# Patient Record
Sex: MALE | Race: WHITE
[De-identification: names, ages, dates, MRNs, and addresses within clinical notes are randomized per-mention and may not be internally consistent; named-entity substitution may affect disease eponyms.]

---

## 2020-09-19 ENCOUNTER — HOSPITAL ENCOUNTER (INPATIENT)
Dept: HOSPITAL 92 - ERS | Age: 60
LOS: 2 days | Discharge: HOME | DRG: 200 | End: 2020-09-21
Attending: SURGERY | Admitting: SURGERY
Payer: COMMERCIAL

## 2020-09-19 VITALS — BODY MASS INDEX: 23.6 KG/M2

## 2020-09-19 DIAGNOSIS — Z88.0: ICD-10-CM

## 2020-09-19 DIAGNOSIS — S42.002A: ICD-10-CM

## 2020-09-19 DIAGNOSIS — J44.9: ICD-10-CM

## 2020-09-19 DIAGNOSIS — S22.41XA: ICD-10-CM
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
## 2020-09-19 DIAGNOSIS — Z98.890: ICD-10-CM

## 2020-09-19 DIAGNOSIS — S27.2XXA: Primary | ICD-10-CM

## 2020-09-19 DIAGNOSIS — Z20.828: ICD-10-CM

## 2020-09-19 LAB
ALBUMIN SERPL BCG-MCNC: 4.2 G/DL (ref 3.4–4.8)
ALP SERPL-CCNC: 40 U/L (ref 40–110)
ALT SERPL W P-5'-P-CCNC: 16 U/L (ref 8–55)
ANION GAP SERPL CALC-SCNC: 14 MMOL/L (ref 10–20)
APTT PPP: 26.5 SEC (ref 22.9–36.1)
AST SERPL-CCNC: 22 U/L (ref 5–34)
BASOPHILS # BLD AUTO: 0.1 THOU/UL (ref 0–0.2)
BASOPHILS NFR BLD AUTO: 0.9 % (ref 0–1)
BILIRUB SERPL-MCNC: 0.4 MG/DL (ref 0.2–1.2)
BUN SERPL-MCNC: 17 MG/DL (ref 8.4–25.7)
CALCIUM SERPL-MCNC: 9 MG/DL (ref 7.8–10.44)
CHLORIDE SERPL-SCNC: 104 MMOL/L (ref 98–107)
CO2 SERPL-SCNC: 25 MMOL/L (ref 23–31)
CREAT CL PREDICTED SERPL C-G-VRATE: 0 ML/MIN (ref 70–130)
EOSINOPHIL # BLD AUTO: 0.1 THOU/UL (ref 0–0.7)
EOSINOPHIL NFR BLD AUTO: 1 % (ref 0–10)
GLOBULIN SER CALC-MCNC: 2.2 G/DL (ref 2.4–3.5)
GLUCOSE SERPL-MCNC: 134 MG/DL (ref 80–115)
HGB BLD-MCNC: 15.8 G/DL (ref 14–18)
INR PPP: 0.9
LIPASE SERPL-CCNC: 23 U/L (ref 8–78)
LYMPHOCYTES # BLD: 2.3 THOU/UL (ref 1.2–3.4)
LYMPHOCYTES NFR BLD AUTO: 22.9 % (ref 21–51)
MCH RBC QN AUTO: 31.7 PG (ref 27–31)
MCV RBC AUTO: 93.1 FL (ref 78–98)
MONOCYTES # BLD AUTO: 0.6 THOU/UL (ref 0.11–0.59)
MONOCYTES NFR BLD AUTO: 5.7 % (ref 0–10)
NEUTROPHILS # BLD AUTO: 6.9 THOU/UL (ref 1.4–6.5)
NEUTROPHILS NFR BLD AUTO: 69.5 % (ref 42–75)
PLATELET # BLD AUTO: 268 THOU/UL (ref 130–400)
POTASSIUM SERPL-SCNC: 3.6 MMOL/L (ref 3.5–5.1)
PROTHROMBIN TIME: 12.4 SEC (ref 12–14.7)
RBC # BLD AUTO: 4.97 MILL/UL (ref 4.7–6.1)
RBC UR QL AUTO: (no result) HPF (ref 0–3)
SODIUM SERPL-SCNC: 139 MMOL/L (ref 136–145)
SP GR UR STRIP: (no result) (ref 1–1.04)
WBC # BLD AUTO: 9.9 THOU/UL (ref 4.8–10.8)
WBC UR QL AUTO: (no result) HPF (ref 0–3)

## 2020-09-19 PROCEDURE — 93005 ELECTROCARDIOGRAM TRACING: CPT

## 2020-09-19 PROCEDURE — 85610 PROTHROMBIN TIME: CPT

## 2020-09-19 PROCEDURE — 72170 X-RAY EXAM OF PELVIS: CPT

## 2020-09-19 PROCEDURE — U0003 INFECTIOUS AGENT DETECTION BY NUCLEIC ACID (DNA OR RNA); SEVERE ACUTE RESPIRATORY SYNDROME CORONAVIRUS 2 (SARS-COV-2) (CORONAVIRUS DISEASE [COVID-19]), AMPLIFIED PROBE TECHNIQUE, MAKING USE OF HIGH THROUGHPUT TECHNOLOGIES AS DESCRIBED BY CMS-2020-01-R: HCPCS

## 2020-09-19 PROCEDURE — 96374 THER/PROPH/DIAG INJ IV PUSH: CPT

## 2020-09-19 PROCEDURE — 83605 ASSAY OF LACTIC ACID: CPT

## 2020-09-19 PROCEDURE — 96361 HYDRATE IV INFUSION ADD-ON: CPT

## 2020-09-19 PROCEDURE — 80307 DRUG TEST PRSMV CHEM ANLYZR: CPT

## 2020-09-19 PROCEDURE — 90471 IMMUNIZATION ADMIN: CPT

## 2020-09-19 PROCEDURE — 96375 TX/PRO/DX INJ NEW DRUG ADDON: CPT

## 2020-09-19 PROCEDURE — 86850 RBC ANTIBODY SCREEN: CPT

## 2020-09-19 PROCEDURE — 74177 CT ABD & PELVIS W/CONTRAST: CPT

## 2020-09-19 PROCEDURE — 84100 ASSAY OF PHOSPHORUS: CPT

## 2020-09-19 PROCEDURE — 81003 URINALYSIS AUTO W/O SCOPE: CPT

## 2020-09-19 PROCEDURE — 83690 ASSAY OF LIPASE: CPT

## 2020-09-19 PROCEDURE — 85730 THROMBOPLASTIN TIME PARTIAL: CPT

## 2020-09-19 PROCEDURE — 85025 COMPLETE CBC W/AUTO DIFF WBC: CPT

## 2020-09-19 PROCEDURE — 71045 X-RAY EXAM CHEST 1 VIEW: CPT

## 2020-09-19 PROCEDURE — 83735 ASSAY OF MAGNESIUM: CPT

## 2020-09-19 PROCEDURE — 80053 COMPREHEN METABOLIC PANEL: CPT

## 2020-09-19 PROCEDURE — G0390 TRAUMA RESPONS W/HOSP CRITI: HCPCS

## 2020-09-19 PROCEDURE — 70450 CT HEAD/BRAIN W/O DYE: CPT

## 2020-09-19 PROCEDURE — 71260 CT THORAX DX C+: CPT

## 2020-09-19 PROCEDURE — 94640 AIRWAY INHALATION TREATMENT: CPT

## 2020-09-19 PROCEDURE — 90715 TDAP VACCINE 7 YRS/> IM: CPT

## 2020-09-19 PROCEDURE — 87635 SARS-COV-2 COVID-19 AMP PRB: CPT

## 2020-09-19 PROCEDURE — 36415 COLL VENOUS BLD VENIPUNCTURE: CPT

## 2020-09-19 PROCEDURE — 81015 MICROSCOPIC EXAM OF URINE: CPT

## 2020-09-19 PROCEDURE — 86901 BLOOD TYPING SEROLOGIC RH(D): CPT

## 2020-09-19 PROCEDURE — 86900 BLOOD TYPING SEROLOGIC ABO: CPT

## 2020-09-19 PROCEDURE — 80048 BASIC METABOLIC PNL TOTAL CA: CPT

## 2020-09-19 PROCEDURE — 72125 CT NECK SPINE W/O DYE: CPT
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
## 2020-09-19 NOTE — RAD
XR Shoulder Lt 3 View STANDARD



History: Pain



Comparison: None.



Findings: Mildly comminuted left distal clavicular fracture at and medial to the coracoid. Left-sided
 rib fractures. Small volume left subcutaneous emphysema and left pneumothorax.



Impression: Left distal clavicular fracture at and medial to the coracoid.



Reported By: Jimi Santillan 

Electronically Signed:  9/19/2020 7:07 PM
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
## 2020-09-19 NOTE — RAD
XR Chest 1 View Portable



History: Pneumothorax



Comparison: CT same day



Findings: Multiple left-sided rib fractures and left fibular fracture without significant further dis
placement. Small left apical pneumothorax although the majority of the distal anterior basilar.



Impression: Majority left-sided pneumothorax is still anterior and basilar with only a small apical c
omponent appreciated.



Reported By: Jimi Santillan 

Electronically Signed:  9/19/2020 6:57 PM
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
## 2020-09-20 LAB
ANION GAP SERPL CALC-SCNC: 10 MMOL/L (ref 10–20)
BASOPHILS # BLD AUTO: 0.1 THOU/UL (ref 0–0.2)
BASOPHILS NFR BLD AUTO: 0.7 % (ref 0–1)
BUN SERPL-MCNC: 13 MG/DL (ref 8.4–25.7)
CALCIUM SERPL-MCNC: 8.3 MG/DL (ref 7.8–10.44)
CHLORIDE SERPL-SCNC: 105 MMOL/L (ref 98–107)
CO2 SERPL-SCNC: 27 MMOL/L (ref 23–31)
CREAT CL PREDICTED SERPL C-G-VRATE: 107 ML/MIN (ref 70–130)
EOSINOPHIL # BLD AUTO: 0.1 THOU/UL (ref 0–0.7)
EOSINOPHIL NFR BLD AUTO: 1.1 % (ref 0–10)
GLUCOSE SERPL-MCNC: 113 MG/DL (ref 80–115)
HGB BLD-MCNC: 14 G/DL (ref 14–18)
LYMPHOCYTES # BLD: 2.4 THOU/UL (ref 1.2–3.4)
LYMPHOCYTES NFR BLD AUTO: 23.7 % (ref 21–51)
MAGNESIUM SERPL-MCNC: 2.1 MG/DL (ref 1.6–2.6)
MCH RBC QN AUTO: 30.6 PG (ref 27–31)
MCV RBC AUTO: 94 FL (ref 78–98)
MONOCYTES # BLD AUTO: 0.9 THOU/UL (ref 0.11–0.59)
MONOCYTES NFR BLD AUTO: 8.3 % (ref 0–10)
NEUTROPHILS # BLD AUTO: 6.8 THOU/UL (ref 1.4–6.5)
NEUTROPHILS NFR BLD AUTO: 66.2 % (ref 42–75)
PLATELET # BLD AUTO: 226 THOU/UL (ref 130–400)
POTASSIUM SERPL-SCNC: 3.8 MMOL/L (ref 3.5–5.1)
RBC # BLD AUTO: 4.56 MILL/UL (ref 4.7–6.1)
SODIUM SERPL-SCNC: 138 MMOL/L (ref 136–145)
WBC # BLD AUTO: 10.2 THOU/UL (ref 4.8–10.8)
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
## 2020-09-20 NOTE — RAD
CHEST 1 VIEW PORTABLE:

 

HISTORY: 

Followup pneumothorax.

 

Left clavicle fracture and left rib fractures are noted.  Possible very small residual anterior left-
sided pneumothorax.  Less inspiration.  Minimal bibasilar pleural and parenchymal opacity changes.

 

IMPRESSION: 

Possible small residual anterior left-sided pneumothorax.  Less inspiration.  Minimal increased shanell
ngs in the bases.  Continue short-term followup.  Very small anterior residual pneumothorax.

 

IMPRESSION: 

Probable small residual anterior pneumothorax, but no significant new process.

 

POS: OFF

## 2020-09-21 VITALS — TEMPERATURE: 97.8 F | SYSTOLIC BLOOD PRESSURE: 112 MMHG | DIASTOLIC BLOOD PRESSURE: 70 MMHG

## 2020-09-21 NOTE — RAD
Chest one view



HISTORY: Pneumothorax. Follow-up.



COMPARISON: 9/20/2020 and 9/19/2020.



Cardiac silhouette is magnified by projection. Pulmonary vasculature is unremarkable.



Mediastinum is midline. Small left apical pneumothorax is similar in appearance to the previous exams
.



Mild bibasilar atelectasis is stable.



Left rib and clavicle fractures again demonstrated.



Right lung well-inflated.



  



IMPRESSION :

Small left apical pneumothorax and other findings are stable.



Reported By: NINI Smith 

Electronically Signed:  9/21/2020 8:06 AM
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
